# Patient Record
Sex: MALE | Race: WHITE
[De-identification: names, ages, dates, MRNs, and addresses within clinical notes are randomized per-mention and may not be internally consistent; named-entity substitution may affect disease eponyms.]

---

## 2019-11-27 PROBLEM — Z00.00 ENCOUNTER FOR PREVENTIVE HEALTH EXAMINATION: Status: ACTIVE | Noted: 2019-11-27

## 2019-12-04 ENCOUNTER — APPOINTMENT (OUTPATIENT)
Dept: UROLOGY | Facility: CLINIC | Age: 34
End: 2019-12-04

## 2019-12-05 ENCOUNTER — APPOINTMENT (OUTPATIENT)
Dept: UROLOGY | Facility: CLINIC | Age: 34
End: 2019-12-05
Payer: COMMERCIAL

## 2019-12-05 VITALS
TEMPERATURE: 98.1 F | BODY MASS INDEX: 21.48 KG/M2 | DIASTOLIC BLOOD PRESSURE: 80 MMHG | HEIGHT: 69 IN | WEIGHT: 145 LBS | HEART RATE: 89 BPM | OXYGEN SATURATION: 94 % | SYSTOLIC BLOOD PRESSURE: 136 MMHG

## 2019-12-05 DIAGNOSIS — R86.8 OTHER ABNORMAL FINDINGS IN SPECIMENS FROM MALE GENITAL ORGANS: ICD-10-CM

## 2019-12-05 DIAGNOSIS — N52.9 MALE ERECTILE DYSFUNCTION, UNSPECIFIED: ICD-10-CM

## 2019-12-05 DIAGNOSIS — N46.11 ORGANIC OLIGOSPERMIA: ICD-10-CM

## 2019-12-05 PROCEDURE — 99203 OFFICE O/P NEW LOW 30 MIN: CPT

## 2019-12-05 NOTE — PHYSICAL EXAM
[General Appearance - Well Nourished] : well nourished [Normal Appearance] : normal appearance [General Appearance - Well Developed] : well developed [Well Groomed] : well groomed [General Appearance - In No Acute Distress] : no acute distress [Edema] : no peripheral edema [] : no respiratory distress [Respiration, Rhythm And Depth] : normal respiratory rhythm and effort [Abdomen Tenderness] : non-tender [Exaggerated Use Of Accessory Muscles For Inspiration] : no accessory muscle use [Abdomen Soft] : soft [Urethral Meatus] : meatus normal [Costovertebral Angle Tenderness] : no ~M costovertebral angle tenderness [No Prostate Nodules] : no prostate nodules [Scrotum] : the scrotum was normal [Testes Mass (___cm)] : there were no testicular masses [Urinary Bladder Findings] : the bladder was normal on palpation [Normal Station and Gait] : the gait and station were normal for the patient's age [No Focal Deficits] : no focal deficits [Skin Color & Pigmentation] : normal skin color and pigmentation [Inguinal Lymph Nodes Enlarged Bilaterally] : inguinal [Oriented To Time, Place, And Person] : oriented to person, place, and time [FreeTextEntry1] : 20 CC BILATERALLY; LEFT VARICOCELE; + DOPPLER REFLUX

## 2019-12-05 NOTE — HISTORY OF PRESENT ILLNESS
[Currently Experiencing ___] :  [unfilled] [FreeTextEntry1] : referred by Chuck Francis\par seen by Dr Brewster at [facility]\par  \par Patient Joshua Mast\par Job: government\par Partner Name: Wilma Mast\par Partner Age: 34\par Prior marriage:  x 4.5 years \par Prior Pregnancy 2 year old son concieved after 9-10 months\par Duration of Relationship: 9 year\par Years unprotected sexual intercourse: attempting second pregnancy 14 months\par Time [place]: 8-9 months\par Sexual dysfunction: some performance anxiety and taking sildenafil with excellent results\par Artificial lubricants:none\par Exposure history - voluntary \par

## 2019-12-05 NOTE — ASSESSMENT
[FreeTextEntry1] : Mr. 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  His son was conceived after approximately 10 months of unprotected intercourse.  They have been having unprotected intercourse for nearly 1-1/2 years at this point without any sexual dysfunction.  He does have some performance anxiety which is responded to sildenafil.  He has previously been seen by Dr. Chuck Francis.  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  He was reportedly had normal endocrinologic studies.  A scrotal ultrasound examination performed by Dr. Francis was reported as being normal.  However examination today reveals cord fullness and reflux on the left side.  I discussed this with the patient.  I suggest him undergoing a repeat ultrasound evaluation to be certain that there is no varicocele that may be contributing to his current difficulties.  I would like him to forward his previous records to me for review.  Genetic studies would not be unreasonable Iin light of severe oligospermia.\par \par \par .

## 2019-12-05 NOTE — REVIEW OF SYSTEMS
[Loss of interest] : loss of interest in sexual activity [Poor quality erections] : Poor quality erections [Negative] : Heme/Lymph

## 2019-12-12 LAB — Y CHROMOSOME MICRODELETION, DNA ANALYSIS: NORMAL

## 2019-12-15 ENCOUNTER — FORM ENCOUNTER (OUTPATIENT)
Age: 34
End: 2019-12-15

## 2019-12-16 ENCOUNTER — OUTPATIENT (OUTPATIENT)
Dept: OUTPATIENT SERVICES | Facility: HOSPITAL | Age: 34
LOS: 1 days | End: 2019-12-16
Payer: COMMERCIAL

## 2019-12-16 ENCOUNTER — APPOINTMENT (OUTPATIENT)
Dept: ULTRASOUND IMAGING | Facility: CLINIC | Age: 34
End: 2019-12-16
Payer: COMMERCIAL

## 2019-12-16 DIAGNOSIS — Z00.8 ENCOUNTER FOR OTHER GENERAL EXAMINATION: ICD-10-CM

## 2019-12-16 PROCEDURE — 93975 VASCULAR STUDY: CPT

## 2019-12-16 PROCEDURE — 93975 VASCULAR STUDY: CPT | Mod: 26

## 2019-12-26 ENCOUNTER — APPOINTMENT (OUTPATIENT)
Dept: UROLOGY | Facility: CLINIC | Age: 34
End: 2019-12-26
Payer: COMMERCIAL

## 2019-12-26 PROCEDURE — 99213 OFFICE O/P EST LOW 20 MIN: CPT

## 2019-12-26 NOTE — HISTORY OF PRESENT ILLNESS
[FreeTextEntry1] : referred by Chuck Francis\par seen by Dr Brewster at [facility]\par  \par Patient Joshua Mast\par Job: government\par Partner Name: Wilma Mast\par Partner Age: 34\par Prior marriage:  x 4.5 years \par Prior Pregnancy 2 year old son concieved after 9-10 months\par Duration of Relationship: 9 year\par Years unprotected sexual intercourse: attempting second pregnancy 14 months\par Time [place]: 8-9 months\par Sexual dysfunction: some performance anxiety and taking sildenafil with excellent results\par Artificial lubricants:none\par Exposure history - voluntary \par \par 12.26.2019\par returns to review laboratory tests\par

## 2019-12-26 NOTE — PHYSICAL EXAM
[Urethral Meatus] : meatus normal [Urinary Bladder Findings] : the bladder was normal on palpation [Scrotum] : the scrotum was normal [No Prostate Nodules] : no prostate nodules [Testes Mass (___cm)] : there were no testicular masses [FreeTextEntry1] : normal testicular size

## 2020-09-23 ENCOUNTER — TRANSCRIPTION ENCOUNTER (OUTPATIENT)
Age: 35
End: 2020-09-23